# Patient Record
Sex: MALE | URBAN - METROPOLITAN AREA
[De-identification: names, ages, dates, MRNs, and addresses within clinical notes are randomized per-mention and may not be internally consistent; named-entity substitution may affect disease eponyms.]

---

## 2024-02-29 ENCOUNTER — APPOINTMENT (RX ONLY)
Dept: URBAN - METROPOLITAN AREA CLINIC 41 | Facility: CLINIC | Age: 44
Setting detail: DERMATOLOGY
End: 2024-02-29

## 2024-02-29 DIAGNOSIS — L98.8 OTHER SPECIFIED DISORDERS OF THE SKIN AND SUBCUTANEOUS TISSUE: ICD-10-CM

## 2024-02-29 PROCEDURE — ? COUNSELING

## 2024-02-29 PROCEDURE — ? COSMETIC CONSULTATION: DYSPORT

## 2024-02-29 PROCEDURE — ? OTHER

## 2024-02-29 PROCEDURE — ? DYSPORT (U OR CC)

## 2024-02-29 NOTE — PROCEDURE: OTHER
Other (Free Text): Recommended cheek filler and  his Glabella if he is not pleased with the dysport. We will readdress his forehead lines at his 2 week follow up.
Note Text (......Xxx Chief Complaint.): This diagnosis correlates with the
Render Risk Assessment In Note?: no
Detail Level: Zone

## 2024-02-29 NOTE — PROCEDURE: DYSPORT (U OR CC)
Forehead Units: 0
Glabellar Complex Units: 90
Dilution (U/0.1 Cc): 12
Consent: Written consent obtained. Risks include but not limited to lid/brow ptosis, bruising, swelling, diplopia, temporary effect, incomplete chemical denervation.
Detail Level: Zone
Price (Use Numbers Only, No Special Characters Or $): 405.00
Additional Area 2 Location: Jelly Roll
Reconstitution Date (Optional): 02/29/2024
Additional Area 1 Location: Tail of eyebrow
Lot #: [id number]
Post-Care Instructions: Patient instructed to not lie down for 4 hours and limit physical activity for 24 hours. Patient instructed not to travel by airplane for 48 hours.
Expiration Date (Month Year): 07/31/2025

## 2024-03-14 ENCOUNTER — APPOINTMENT (RX ONLY)
Dept: URBAN - METROPOLITAN AREA CLINIC 41 | Facility: CLINIC | Age: 44
Setting detail: DERMATOLOGY
End: 2024-03-14

## 2024-03-14 DIAGNOSIS — L98.8 OTHER SPECIFIED DISORDERS OF THE SKIN AND SUBCUTANEOUS TISSUE: ICD-10-CM

## 2024-03-14 DIAGNOSIS — L90.8 OTHER ATROPHIC DISORDERS OF SKIN: ICD-10-CM

## 2024-03-14 PROCEDURE — ? COSMETIC CONSULTATION: FILLERS

## 2024-03-14 PROCEDURE — ? COSMETIC FOLLOW-UP

## 2024-03-14 PROCEDURE — ? COUNSELING

## 2024-03-14 PROCEDURE — ? OTHER (COSMETIC)

## 2024-03-14 NOTE — PROCEDURE: COSMETIC FOLLOW-UP
Side Effects Or Complications: None
Detail Level: Zone
Patient Satisfaction: Pleased
Treatment (Optional): Dysport
Global Improvement: Good

## 2024-03-14 NOTE — PROCEDURE: OTHER (COSMETIC)
Other (Free Text): The patient has had significant improvement in his glabella with dysport but he still has to significant divets. I recommended restalyne L in the patients glabella. He states he would like to think about it and call back if he is interested.
Detail Level: Zone